# Patient Record
Sex: FEMALE | URBAN - METROPOLITAN AREA
[De-identification: names, ages, dates, MRNs, and addresses within clinical notes are randomized per-mention and may not be internally consistent; named-entity substitution may affect disease eponyms.]

---

## 2024-05-06 ENCOUNTER — TELEPHONE (OUTPATIENT)
Dept: PERINATAL CARE | Facility: HOSPITAL | Age: 34
End: 2024-05-06

## 2024-05-06 NOTE — TELEPHONE ENCOUNTER
Returned pt call RE scheduling.  No answer  Left Voice mail to call back with [facility] number  [phone]

## 2024-05-06 NOTE — TELEPHONE ENCOUNTER
Gave fax # [phone] to have order faxed in prior to scheduling with [facility].

## 2024-05-10 NOTE — TELEPHONE ENCOUNTER
Returned pt call RE scheduling.  No answer  Left Voice mail to call back with [facility] number  [phone]

## 2024-05-13 NOTE — PROGRESS NOTES
Outpatient Maternal-Fetal Medicine Consultation    Dear Dr. Acosta    Thank you for requesting maternal fetal medicine consultation on your patient Joceline Neal.  As you are aware she is a 33 year old female .  A maternal-fetal medicine consultation was requested secondary to Advanced Maternal Age, history 3rd trimester stillbirth..  Her prenatal records and labs were reviewed.    She is planning an IVF pregnancy.  She has been referred by her infertility doctor for clearance for IVF and pregnancy.  She had a 32-week stillbirth in 2018.  She named her son Andrew.  It was an unexplained stillbirth.  Autopsy was performed which showed blood clots.  They stated that the blood clots could have happened before or after the stillbirth.  Genetic testing showed normal chromosomes.  She does not know what kind of a stillbirth workup that she had.  She does not have records in her personal possession.  She had a miscarriage at 12 to 13 weeks after that but does not know if the baby measured 10 weeks or beyond.  In  she had her daughterDamari at term.  She was induced at approximately 38 weeks 5 days.  She did not have any complications in that pregnancy.  She has had 2 vaginal deliveries.    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  Her spouse has been tested and he is negative.  However a different panel was used for her spouse and found a different genetic disease that he was a carrier for.  Her testing for this disease is pending.    She does not know if she has had antiphospholipid antibody testing during her infertility workup, after her miscarriage, or at the time of her stillbirth.    She took a low-dose aspirin in her last pregnancy and asks if she should do that again.  ROS    HISTORY  OB History   No obstetric history on file.    x 2.  32-week stillbirth.  1 term live birth.    Allergies:  Not on File NKDA   Current Meds:  No current outpatient medications on  file.      PNV  HISTORY:  No past medical history on file. History 32 week stillbirth   No past surgical history on file.  x 2   No family history on file.   Social History     Socioeconomic History    Marital status: Unknown     Social Determinants of Health      Received from [facility]          PHYSICAL EXAMINATION:  /78   Pulse 120   Wt 120 lb (54.4 kg)   Physical Exam  Constitutional:       Appearance: Normal appearance.   Abdominal:      Palpations: Abdomen is soft.      Tenderness: There is no abdominal tenderness.   Neurological:      Mental Status: She is alert.   Psychiatric:         Mood and Affect: Mood normal.         Behavior: Behavior normal.             DISCUSSION  During her visit we discussed and reviewed the following issues:  ADVANCED MATERNAL AGE    Background  I reviewed with the patient that pregnancies in women of advanced maternal age (35 or older at delivery) are associated with elevated risks. Specifically, there is a higher rate of:  Fetal malformations  Preeclampsia  Gestational diabetes  Intrauterine fetal death    As a result, enhanced pregnancy surveillance is advised for these patients including a comprehensive ultrasound to assess for fetal malformations (at 20 weeks) and a third trimester ultrasound assessment for fetal growth (at 32 weeks). In addition, weekly NST's (initiating at 36 weeks gestation for women 35-39 years and at 32 weeks gestation for women 40 years and older) are also advised. Routine obstetric care is more than adequate to assess for gestational diabetes and preeclampsia; hence, no further significant alterations in obstetric care are advised.    Medical Complications    Women 35 years of age or older can expect to experience two to three fold higher rates of hospitalization,  delivery, and pregnancy-related complications when compared to their younger counterparts.  The two most common medical problems complicating these   pregnanccies are hypertension and diabetes.   The incidence of preeclampsia in the general obstetric population is 3 to 4 percent; this increases to 5 to 10 percent in women over age 40 and is as high as 35 percent in women over age 50.   The incidence of gestational diabetes in the general obstetric population is 3 percent, rising to 7 to 12 percent in women over age 40 and 20 percent in women over age 50.  Women 35 years of age or older are more likely to be delivered by . The  delivery rate in the general obstetric population of the [place] is almost 30 percent, compared to almost 50 percent in women over age 40 to 45 and almost 80 percent in women age 50 to 63.          Fetal Death        A decision analysis tool using data from the [employer] predicted a strategy of weekly antepartum testing and labor induction would lower the risk of unexplained fetal death in women 35 years of age or older. In this model, weekly testing starting at 36 weeks of gestation would drop the risk of fetal death from 5.2 to 1.3 per 1000 pregnancies. While a policy of antepartum testing in older women does increase the chance that a women will be induced (71 inductions per fetal death averted) and thereby increases her risk of having a  delivery, only 14 additional cesareans would need to be performed to avert one unexplained fetal death.  Hence, weekly NST's are advised for women of advanced maternal age; testing should be initiated at 36 weeks for women 35-39 years and at 32 weeks for women 40 years and older.    Fetal Malformations    Cardiac malformations, clubfoot, and diaphragmatic hernia appear to occur with increased frequency in offspring of older women. These abnormalities are structural and unrelated to aneuploidy, thus they would not be detected by karyotype analysis.  For these reasons a complete, detailed ultrasound (level II) is advised even if the fetus has a  normal karyotype.      Fetal Aneuploidy      Invasive Testing  I offered invasive genetic testing (amniocentesis, chorionic villus sampling) after reviewing the diagnostic accuracy of these tests as well as the procedure associated loss rate (1:500 for genetic amniocentesis).    She ultimately does not desire invasive genetic testing.     We discussed  the increased risk of chromosomal abnormalities associated with advanced maternal age at age  34 year old. She understands that ultrasound exam cannot exclude potential genetic abnormalities.  Her estimated risk based on maternal age at term with any chromosome abnormality is about 1: 150 and with Down Syndrome is about 1: 267.   We also discussed the risks and benefits of having  genetic testing ([facility] and amniocentesis) performed.      Non-invasive Pregnancy Testing (NIPT)  I reviewed current non-invasive screening options. Currently non-invasive pregnancy testing (NIPT) offers the highest detection rate (with the lowest false positive rate) for the detection of fetal aneuploidy amongst high-risk patients. The limitations of detailed mid-trimester sonography was reviewed with the patient. First trimester screening and second trimester multiple-marker serum serum screening as alternative aneuploidy screening options were also reviewed. However, both of these tests are associated with lower detection and higher false positive rates.    Women with a prior stillbirth are at higher risk of stillbirth in future pregnancies than women with prior live births; the magnitude of risk (two- to ten-fold) is affected by multiple factors, including characteristics of the stillbirth and race. It is important to discuss possible causes and risks for recurrence and a plan for the next pregnancy.    The etiology is not able to be determined in 25 to 50 percent of cases, despite all evaluative efforts.  In low-risk women with unexplained stillbirth, the risk of recurrence is 7.8 to 10.5  per 1000 births and most of these deaths occur before 37 weeks of gestation. The risk of recurrent stillbirth at term is only 1.8 per 1000 births. However, the patient is at risk of abruption,  birth, and low birthweight in the subsequent pregnancy.    If a specific cause for the fetal demise or stillbirth is identified, then an estimate of the recurrence risk varies based on the cause.  Common causes include placental abruption, inherited thrombophilia, antiphospholipid syndrome and preeclampsia to name a few.    There is a hypothesis that placental abruption, intrauterine growth restriction, preeclampsia, and stillbirth are different manifestations of ischemic placental disease and predispose to  birth.  Thus, women with these pregnancy complications comprise a high risk group in their subsequent pregnancies.  The gestational age at loss of the index pregnancy is also an important factor. In a study of 95 women with pregnancy loss at 13 to 24 weeks, the next pregnancy in these women was characterized by increased risks of  delivery (40 percent), stillbirth (5 percent), and  death (6 percent).         Prevention of Preeclampsia    Antiplatelet Agents  The observation that preeclampsia is associated with increased platelet turnover and increased platelet-derived thromboxane levels led to randomized trials evaluating low-dose aspirin therapy in women thought to be at increased risk of the disease. As opposed to higher dose aspirin therapy, low-dose aspirin (60 to 150 mg per day) diminishes platelet thromboxane synthesis while maintaining vascular wall prostacyclin synthesis. Although not well studied, the beneficial effect of low-dose aspirin for prevention of preeclampsia may also be in part related to modulation of inflammation. The drug has been studied for both prevention of preeclampsia and prevention of progression from mild to severe disease. It appears to result in a modest  reduction in risk of preeclampsia when given to women at moderate to high risk of preeclampsia.  This approach has been studied in over 35,000 women, for both prevention of preeclampsia and prevention of progression of the disease. Low dose aspirin has a modest impact on pregnancy outcome; it reduces the risk of preeclampsia, as well as other adverse pregnancy outcomes (eg,  delivery, fetal growth restriction) by about 10 to 20 percent. Low dose aspirin is safe in pregnancy; thus, it is a reasonable strategy in women with a moderate to high risk of preeclampsia in whom the benefits outweigh the risks.     Clinical Guidelines  Based on the available data, low-dose aspirin is advised for women at high risk for preeclampsia. There is no consensus on the criteria that confer high risk. The [place] Preventive Services Task Force (USPSTF) risk criteria are reasonable.  USPSTF criteria for high risk include one or more of the following:   Previous pregnancy with preeclampsia, especially early onset and with an adverse outcome   Multifetal gestation  Chronic hypertension   Type 1 or 2 diabetes mellitus  Chronic kidney disease  Autoimmune disease (antiphospholipid syndrome, systemic lupus erythematosus)     Women with multiple moderate risk factors for preeclampsia are considered high risk, as well. Identification of women with an appropriate combination of moderate risk factors to be considered high risk is subjective and determined case by case, as the data describing the magnitude of the association between each of these risk factors and development of preeclampsia vary widely and lack consistency. USPSTF criteria for moderate risk include:  Nulliparity  Obesity (body mass index >30 kg/m2)  Family history of preeclampsia in mother or sister  Age ?35 years  Sociodemographic characteristics (, low socioeconomic level)  Personal risk factors (eg, history of low birth weight or small for  gestational age, previous adverse pregnancy outcome, >10 year pregnancy interval)     If used, daily low-dose aspirin should be initiated in the 12th or 13th week of gestation, although adverse effects from earlier initiation (eg, preconception) have not been documented. The optimum low dose is unclear; 81 mg is readily available, but 100 or 150 mg (which are not available in some countries including the [place] may be more effective.  In some pregnant women, platelet function is not inhibited by the 81 mg dose but is altered by higher dosing. Hence, current evidence has suggested a daily dose of 100 to 150 mg of aspirin rather than a lower dose. In the [place], this can be achieved by taking one and one-half 81 mg tablets; however, taking one 81 mg tablet is also reasonable since this is the commercially available dose and has proven efficacy. For prevention of preeclampsia, no trials have evaluated the efficacy of a higher dose of aspirin, which could be achieved easily by taking two 81 mg tablets or splitting a 325 mg tablet. For prevention of myocardial infarction and stroke in nonpregnant individuals, aspirin appears to be equally effective at doses between 75 and 325 mg per day.  The safety of low-dose aspirin use in the second and third trimesters is well established, but questions linger regarding use in the first trimester (possible increase in minor vaginal bleeding or gastroschisis). Early therapy (before 16 weeks) may be important since the pathophysiologic features of preeclampsia develop at this time, weeks before clinical disease is apparent. However, available evidence is inconsistent about the importance of early initiation of therapy, possibly because aspirin has major effects on prostacyclin production and endothelial function throughout gestation.  Aspirin is discontinued 5 to 10 days before expected delivery to diminish the risk of bleeding during delivery; however, no adverse maternal or  fetal effects related to low-dose aspirin have been proven.  Major questions remain as to which, if any, subgroups of women are more likely to benefit from low-dose aspirin therapy, when treatment should be started (first or second trimester), and the optimum dose to inhibit placental PGH synthase (cyclooxygenase) and also allow the anti-inflammatory effects of low-dose aspirin.    Conception by IVF is associated with an increased incidence of several obstetrical and  complications. Most of these are related to the high incidence of multiple gestations.  The precise reasons for this increase in adverse outcomes are not clear.        ART is associated with an up to two-fold increased risk of  birth and low birth weight in mckay pregnancies.  ART does not appear to be an independent risk factor for adverse neurodevelopment outcome.  ART appears to be at increased risk of delivering offspring with congenital malformations compared with fertile women who conceive naturally.     Heart defects have been reported as high at 6 % so fetal echocardiography is recommended in all IVF patients. Stillbirth and  mortality rates appear to be increased as much as four-fold.       Mckay ART pregnancies, the relative risk of common pregnancy complications such as fetal growth restriction, preeclampsia, prematurity and  mortality are increased.          antiphospholipid antibody syndrome and can cause a loss at 10 weeks or beyond.  Need labs twice in order to be have the syndrome.  You need to have clinical criteria which includes stillbirth at 10 weeks or beyond as one of the criteria in order to send the testing.  Since she is unsure if she has ever been tested and I do not see the testing and the labs that were performed at the hospital when she had her son.I have recommended that she have antiphospholipid antibody testing. This should not delay her IVF process.  If the initial set of labs  comes back and she is positive and pregnant or becomes pregnant prior to the second episode of testing, I would recommend starting her on Lovenox and low-dose aspirin at that time.  She would then be retested 12 weeks later during her pregnancy, and then the Lovenox could be stopped if the second set of labs is negative.      IMPRESSION:  AMA   History unexplained 32 week stillbirth  Desires pregnancy    RECOMMENDATIONS:  Continue care with Dr. Acosta  Low dose aspirin 81 mg daily during pregnancy  Daily prenatal vitamin  Regular exercise is encouraged  Screen for diabetes prior to pregnancy if BMI 25 or more.  Nuchal translucency at 12 weeks  Aneuploidy screening or diagnostic testing if desired.  20 week level II ultrasound with MFM  Fetal echo 24 weeks  Monthly growth US starting at 28 weeks with BPP at or beyond 32 weeks  Weekly NST's at 30 weeks.  Delivery at 38-39 weeks  Recommend antiphospholipid antibody testing (lupus anticoagulant, B2 glycoprotein IgG & IgM, anticardiolipin IgG/IgM).  If positive, then start lovenox 40 mg daily and aspirin 81 mg daily. In 12 weeks, repeat the antiphospholipid antibody testing.  If positive a second time, continue the treatment through the pregnancy and 6 weeks postpartum.  If the second test is neg, then discontinue the lovenox and aspirin.            Thank you for allowing me to participate in the care of your patient.  Please do not hesitate to contact me if additional questions or concerns arise.      Giselle Herrera M.D.    55 minutes spent in review of records, patient consultation, documentation and coordination of care.  The relevant clinical matter(s) are summarized above.     Note to patient and family  The 21st Century Cures Act makes medical notes available to patients in the interest of transparency.  However, please be advised that this is a medical document.  It is intended as [id number] communication.  It is written and medical language may contain  abbreviations or verbiage that are technical and unfamiliar.  It may appear blunt or direct.  Medical documents are intended to carry relevant information, facts as evident, and the clinical opinion of the practitioner.

## 2024-05-17 ENCOUNTER — HOSPITAL ENCOUNTER (OUTPATIENT)
Dept: PERINATAL CARE | Facility: HOSPITAL | Age: 34
Discharge: HOME | End: 2024-05-17
Attending: OBSTETRICS & GYNECOLOGY

## 2024-05-17 VITALS — HEART RATE: 120 BPM | DIASTOLIC BLOOD PRESSURE: 78 MMHG | WEIGHT: 120 LBS | SYSTOLIC BLOOD PRESSURE: 133 MMHG

## 2024-05-17 DIAGNOSIS — Z87.59 HISTORY OF FETAL DEMISE, NOT CURRENTLY PREGNANT: ICD-10-CM

## 2024-05-17 DIAGNOSIS — Z31.9 DESIRE FOR PREGNANCY: Primary | ICD-10-CM

## (undated) NOTE — LETTER
May 17, 2024      Avinash Acosta MD  [address]  [place] [address]  Via Fax: [phone]  Patient: Joceline Neal  : 1990    Dear Colleague:  Thank you for referring your patient to me for a Maternal Fetal Medicine evaluation. Please see my attached note for my findings and recommendations.      Should you have any questions or concerns, please do not hesitate to contact me at the number listed below.    Best Regards,      Giselle Herrera MD  [facility] MATERNAL FETAL MEDICINE  [address] [facility] [address]  [phone]    cc: No Recipients      [facility] Department of Maternal Fetal Medicine  Patient Name: Joceline Neal  Patient : 1990  Physician: Giselle Herrera MD    Outpatient Maternal-Fetal Medicine Consultation    Dear Dr. Acosta    Thank you for requesting maternal fetal medicine consultation on your patient Joceline Neal.  As you are aware she is a 33 year old female .  A maternal-fetal medicine consultation was requested secondary to Advanced Maternal Age, history 3rd trimester stillbirth..  Her prenatal records and labs were reviewed.    She is planning an IVF pregnancy.  She has been referred by her infertility doctor for clearance for IVF and pregnancy.  She had a 32-week stillbirth in 2018.  She named her son Andrew.  It was an unexplained stillbirth.  Autopsy was performed which showed blood clots.  They stated that the blood clots could have happened before or after the stillbirth.  Genetic testing showed normal chromosomes.  She does not know what kind of a stillbirth workup that she had.  She does not have records in her personal possession.  She had a miscarriage at 12 to 13 weeks after that but does not know if the baby measured 10 weeks or beyond.  In  she had her daughterDamari at term.  She was induced at approximately 38 weeks 5 days.  She did not have any complications in that pregnancy.  She has had 2  vaginal deliveries.    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  Her spouse has been tested and he is negative.  However a different panel was used for her spouse and found a different genetic disease that he was a carrier for.  Her testing for this disease is pending.    She does not know if she has had antiphospholipid antibody testing during her infertility workup, after her miscarriage, or at the time of her stillbirth.    She took a low-dose aspirin in her last pregnancy and asks if she should do that again.  ROS    HISTORY  OB History   No obstetric history on file.    x 2.  32-week stillbirth.  1 term live birth.    Allergies:  Not on File NKDA   Current Meds:  No current outpatient medications on file.      PNV  HISTORY:  No past medical history on file. History 32 week stillbirth   No past surgical history on file.  x 2   No family history on file.   Social History     Socioeconomic History    Marital status: Unknown     Social Determinants of Health      Received from [facility]          PHYSICAL EXAMINATION:  /78   Pulse 120   Wt 120 lb (54.4 kg)   Physical Exam  Constitutional:       Appearance: Normal appearance.   Abdominal:      Palpations: Abdomen is soft.      Tenderness: There is no abdominal tenderness.   Neurological:      Mental Status: She is alert.   Psychiatric:         Mood and Affect: Mood normal.         Behavior: Behavior normal.             DISCUSSION  During her visit we discussed and reviewed the following issues:  ADVANCED MATERNAL AGE    Background  I reviewed with the patient that pregnancies in women of advanced maternal age (35 or older at delivery) are associated with elevated risks. Specifically, there is a higher rate of:  Fetal malformations  Preeclampsia  Gestational diabetes  Intrauterine fetal death    As a result, enhanced pregnancy surveillance is advised for these patients including a comprehensive  ultrasound to assess for fetal malformations (at 20 weeks) and a third trimester ultrasound assessment for fetal growth (at 32 weeks). In addition, weekly NST's (initiating at 36 weeks gestation for women 35-39 years and at 32 weeks gestation for women 40 years and older) are also advised. Routine obstetric care is more than adequate to assess for gestational diabetes and preeclampsia; hence, no further significant alterations in obstetric care are advised.    Medical Complications    Women 35 years of age or older can expect to experience two to three fold higher rates of hospitalization,  delivery, and pregnancy-related complications when compared to their younger counterparts.  The two most common medical problems complicating these  pregnanccies are hypertension and diabetes.   The incidence of preeclampsia in the general obstetric population is 3 to 4 percent; this increases to 5 to 10 percent in women over age 40 and is as high as 35 percent in women over age 50.   The incidence of gestational diabetes in the general obstetric population is 3 percent, rising to 7 to 12 percent in women over age 40 and 20 percent in women over age 50.  Women 35 years of age or older are more likely to be delivered by . The  delivery rate in the general obstetric population of the [place] is almost 30 percent, compared to almost 50 percent in women over age 40 to 45 and almost 80 percent in women age 50 to 63.          Fetal Death        A decision analysis tool using data from the [place] [facility]  Database predicted a strategy of weekly antepartum testing and labor induction would lower the risk of unexplained fetal death in women 35 years of age or older. In this model, weekly testing starting at 36 weeks of gestation would drop the risk of fetal death from 5.2 to 1.3 per 1000 pregnancies. While a policy of antepartum testing in older women does increase the chance that a women will be  induced (71 inductions per fetal death averted) and thereby increases her risk of having a  delivery, only 14 additional cesareans would need to be performed to avert one unexplained fetal death.  Hence, weekly NST's are advised for women of advanced maternal age; testing should be initiated at 36 weeks for women 35-39 years and at 32 weeks for women 40 years and older.    Fetal Malformations    Cardiac malformations, clubfoot, and diaphragmatic hernia appear to occur with increased frequency in offspring of older women. These abnormalities are structural and unrelated to aneuploidy, thus they would not be detected by karyotype analysis.  For these reasons a complete, detailed ultrasound (level II) is advised even if the fetus has a normal karyotype.      Fetal Aneuploidy      Invasive Testing  I offered invasive genetic testing (amniocentesis, chorionic villus sampling) after reviewing the diagnostic accuracy of these tests as well as the procedure associated loss rate (1:500 for genetic amniocentesis).    She ultimately does not desire invasive genetic testing.     We discussed  the increased risk of chromosomal abnormalities associated with advanced maternal age at age  34 year old. She understands that ultrasound exam cannot exclude potential genetic abnormalities.  Her estimated risk based on maternal age at term with any chromosome abnormality is about 1: 150 and with Down Syndrome is about 1: 267.   We also discussed the risks and benefits of having  genetic testing ([facility] and amniocentesis) performed.      Non-invasive Pregnancy Testing (NIPT)  I reviewed current non-invasive screening options. Currently non-invasive pregnancy testing (NIPT) offers the highest detection rate (with the lowest false positive rate) for the detection of fetal aneuploidy amongst high-risk patients. The limitations of detailed mid-trimester sonography was reviewed with the patient. First trimester screening and second  trimester multiple-marker serum serum screening as alternative aneuploidy screening options were also reviewed. However, both of these tests are associated with lower detection and higher false positive rates.    Women with a prior stillbirth are at higher risk of stillbirth in future pregnancies than women with prior live births; the magnitude of risk (two- to ten-fold) is affected by multiple factors, including characteristics of the stillbirth and race. It is important to discuss possible causes and risks for recurrence and a plan for the next pregnancy.    The etiology is not able to be determined in 25 to 50 percent of cases, despite all evaluative efforts.  In low-risk women with unexplained stillbirth, the risk of recurrence is 7.8 to 10.5 per 1000 births and most of these deaths occur before 37 weeks of gestation. The risk of recurrent stillbirth at term is only 1.8 per 1000 births. However, the patient is at risk of abruption,  birth, and low birthweight in the subsequent pregnancy.    If a specific cause for the fetal demise or stillbirth is identified, then an estimate of the recurrence risk varies based on the cause.  Common causes include placental abruption, inherited thrombophilia, antiphospholipid syndrome and preeclampsia to name a few.    There is a hypothesis that placental abruption, intrauterine growth restriction, preeclampsia, and stillbirth are different manifestations of ischemic placental disease and predispose to  birth.  Thus, women with these pregnancy complications comprise a high risk group in their subsequent pregnancies.  The gestational age at loss of the index pregnancy is also an important factor. In a study of 95 women with pregnancy loss at 13 to 24 weeks, the next pregnancy in these women was characterized by increased risks of  delivery (40 percent), stillbirth (5 percent), and  death (6 percent).         Prevention of Preeclampsia    Antiplatelet  Agents  The observation that preeclampsia is associated with increased platelet turnover and increased platelet-derived thromboxane levels led to randomized trials evaluating low-dose aspirin therapy in women thought to be at increased risk of the disease. As opposed to higher dose aspirin therapy, low-dose aspirin (60 to 150 mg per day) diminishes platelet thromboxane synthesis while maintaining vascular wall prostacyclin synthesis. Although not well studied, the beneficial effect of low-dose aspirin for prevention of preeclampsia may also be in part related to modulation of inflammation. The drug has been studied for both prevention of preeclampsia and prevention of progression from mild to severe disease. It appears to result in a modest reduction in risk of preeclampsia when given to women at moderate to high risk of preeclampsia.  This approach has been studied in over 35,000 women, for both prevention of preeclampsia and prevention of progression of the disease. Low dose aspirin has a modest impact on pregnancy outcome; it reduces the risk of preeclampsia, as well as other adverse pregnancy outcomes (eg,  delivery, fetal growth restriction) by about 10 to 20 percent. Low dose aspirin is safe in pregnancy; thus, it is a reasonable strategy in women with a moderate to high risk of preeclampsia in whom the benefits outweigh the risks.     Clinical Guidelines  Based on the available data, low-dose aspirin is advised for women at high risk for preeclampsia. There is no consensus on the criteria that confer high risk. The [place] Preventive Services Task Force (USPSTF) risk criteria are reasonable.  USPSTF criteria for high risk include one or more of the following:   Previous pregnancy with preeclampsia, especially early onset and with an adverse outcome   Multifetal gestation  Chronic hypertension   Type 1 or 2 diabetes mellitus  Chronic kidney disease  Autoimmune disease (antiphospholipid syndrome,  systemic lupus erythematosus)     Women with multiple moderate risk factors for preeclampsia are considered high risk, as well. Identification of women with an appropriate combination of moderate risk factors to be considered high risk is subjective and determined case by case, as the data describing the magnitude of the association between each of these risk factors and development of preeclampsia vary widely and lack consistency. USPSTF criteria for moderate risk include:  Nulliparity  Obesity (body mass index >30 kg/m2)  Family history of preeclampsia in mother or sister  Age ?35 years  Sociodemographic characteristics (, low socioeconomic level)  Personal risk factors (eg, history of low birth weight or small for gestational age, previous adverse pregnancy outcome, >10 year pregnancy interval)     If used, daily low-dose aspirin should be initiated in the 12th or 13th week of gestation, although adverse effects from earlier initiation (eg, preconception) have not been documented. The optimum low dose is unclear; 81 mg is readily available, but 100 or 150 mg (which are not available in some countries including the [place] may be more effective.  In some pregnant women, platelet function is not inhibited by the 81 mg dose but is altered by higher dosing. Hence, current evidence has suggested a daily dose of 100 to 150 mg of aspirin rather than a lower dose. In the [place], this can be achieved by taking one and one-half 81 mg tablets; however, taking one 81 mg tablet is also reasonable since this is the commercially available dose and has proven efficacy. For prevention of preeclampsia, no trials have evaluated the efficacy of a higher dose of aspirin, which could be achieved easily by taking two 81 mg tablets or splitting a 325 mg tablet. For prevention of myocardial infarction and stroke in nonpregnant individuals, aspirin appears to be equally effective at doses between 75 and 325 mg per  day.  The safety of low-dose aspirin use in the second and third trimesters is well established, but questions linger regarding use in the first trimester (possible increase in minor vaginal bleeding or gastroschisis). Early therapy (before 16 weeks) may be important since the pathophysiologic features of preeclampsia develop at this time, weeks before clinical disease is apparent. However, available evidence is inconsistent about the importance of early initiation of therapy, possibly because aspirin has major effects on prostacyclin production and endothelial function throughout gestation.  Aspirin is discontinued 5 to 10 days before expected delivery to diminish the risk of bleeding during delivery; however, no adverse maternal or fetal effects related to low-dose aspirin have been proven.  Major questions remain as to which, if any, subgroups of women are more likely to benefit from low-dose aspirin therapy, when treatment should be started (first or second trimester), and the optimum dose to inhibit placental PGH synthase (cyclooxygenase) and also allow the anti-inflammatory effects of low-dose aspirin.    Conception by IVF is associated with an increased incidence of several obstetrical and  complications. Most of these are related to the high incidence of multiple gestations.  The precise reasons for this increase in adverse outcomes are not clear.        ART is associated with an up to two-fold increased risk of  birth and low birth weight in solis pregnancies.  ART does not appear to be an independent risk factor for adverse neurodevelopment outcome.  ART appears to be at increased risk of delivering offspring with congenital malformations compared with fertile women who conceive naturally.     Heart defects have been reported as high at 6 % so fetal echocardiography is recommended in all IVF patients. Stillbirth and  mortality rates appear to be increased as much as  four-fold.       [facility] ART pregnancies, the relative risk of common pregnancy complications such as fetal growth restriction, preeclampsia, prematurity and  mortality are increased.          antiphospholipid antibody syndrome and can cause a loss at 10 weeks or beyond.  Need labs twice in order to be have the syndrome.  You need to have clinical criteria which includes stillbirth at 10 weeks or beyond as one of the criteria in order to send the testing.  Since she is unsure if she has ever been tested and I do not see the testing and the labs that were performed at the hospital when she had her son.I have recommended that she have antiphospholipid antibody testing. This should not delay her IVF process.  If the initial set of labs comes back and she is positive and pregnant or becomes pregnant prior to the second episode of testing, I would recommend starting her on Lovenox and low-dose aspirin at that time.  She would then be retested 12 weeks later during her pregnancy, and then the Lovenox could be stopped if the second set of labs is negative.      IMPRESSION:  AMA   History unexplained 32 week stillbirth  Desires pregnancy    RECOMMENDATIONS:  Continue care with Dr. Acosta  Low dose aspirin 81 mg daily during pregnancy  Daily prenatal vitamin  Regular exercise is encouraged  Screen for diabetes prior to pregnancy if BMI 25 or more.  Nuchal translucency at 12 weeks  Aneuploidy screening or diagnostic testing if desired.  20 week level II ultrasound with MFM  Fetal echo 24 weeks  Monthly growth US starting at 28 weeks with BPP at or beyond 32 weeks  Weekly NST's at 30 weeks.  Delivery at 38-39 weeks  Recommend antiphospholipid antibody testing (lupus anticoagulant, B2 glycoprotein IgG & IgM, anticardiolipin IgG/IgM).  If positive, then start lovenox 40 mg daily and aspirin 81 mg daily. In 12 weeks, repeat the antiphospholipid antibody testing.  If positive a second time, continue the treatment through  the pregnancy and 6 weeks postpartum.  If the second test is neg, then discontinue the lovenox and aspirin.            Thank you for allowing me to participate in the care of your patient.  Please do not hesitate to contact me if additional questions or concerns arise.      Giselle Herrera M.D.    55 minutes spent in review of records, patient consultation, documentation and coordination of care.  The relevant clinical matter(s) are summarized above.     Note to patient and family  The 21st Century Cures Act makes medical notes available to patients in the interest of transparency.  However, please be advised that this is a medical document.  It is intended as [id number] communication.  It is written and medical language may contain abbreviations or verbiage that are technical and unfamiliar.  It may appear blunt or direct.  Medical documents are intended to carry relevant information, facts as evident, and the clinical opinion of the practitioner.    Pt for pre IVF consult  Denies complaints